# Patient Record
Sex: FEMALE | Race: BLACK OR AFRICAN AMERICAN | NOT HISPANIC OR LATINO | URBAN - METROPOLITAN AREA
[De-identification: names, ages, dates, MRNs, and addresses within clinical notes are randomized per-mention and may not be internally consistent; named-entity substitution may affect disease eponyms.]

---

## 2017-01-03 ENCOUNTER — IMPORTED ENCOUNTER (OUTPATIENT)
Dept: URBAN - METROPOLITAN AREA CLINIC 31 | Facility: CLINIC | Age: 72
End: 2017-01-03

## 2017-01-03 PROBLEM — Z96.1: Noted: 2017-01-03

## 2017-01-03 PROBLEM — H40.11X2: Noted: 2017-01-03

## 2017-01-03 PROCEDURE — 99214 OFFICE O/P EST MOD 30 MIN: CPT

## 2017-01-03 NOTE — PATIENT DISCUSSION
1.  Primary Open Angle Glaucoma OU - Continue with current treatment plan. Discussed importance of compliance. Will continue to monitor. 2.  Pseudophakia OU - IOLs stable. Monitor. 3. Return for an appointment in 3 months for pressure check. VF 24-2. with Dr. Ela Santiago.

## 2017-07-11 ENCOUNTER — IMPORTED ENCOUNTER (OUTPATIENT)
Dept: URBAN - METROPOLITAN AREA CLINIC 31 | Facility: CLINIC | Age: 72
End: 2017-07-11

## 2017-07-11 PROBLEM — H40.11X2: Noted: 2017-07-11

## 2017-07-11 PROBLEM — Z96.1: Noted: 2017-07-11

## 2017-07-11 PROCEDURE — 99213 OFFICE O/P EST LOW 20 MIN: CPT

## 2017-07-11 NOTE — PATIENT DISCUSSION
1.  Primary Open Angle Glaucoma OU - Continue with current treatment plan. Discussed importance of compliance. Will continue to monitor. 2.  Pseudophakia OU - IOLs stable. Monitor. Return for an appointment in 3 months for dilated fundus exam. OCT Nerve. with Dr. Jude Cuellar.

## 2017-10-13 ENCOUNTER — IMPORTED ENCOUNTER (OUTPATIENT)
Dept: URBAN - METROPOLITAN AREA CLINIC 31 | Facility: CLINIC | Age: 72
End: 2017-10-13

## 2017-10-13 PROBLEM — H40.11X2: Noted: 2017-10-13

## 2017-10-13 PROBLEM — Z96.1: Noted: 2017-10-13

## 2017-10-13 PROCEDURE — 99214 OFFICE O/P EST MOD 30 MIN: CPT

## 2017-10-13 PROCEDURE — 92133 CPTRZD OPH DX IMG PST SGM ON: CPT

## 2017-10-13 NOTE — PATIENT DISCUSSION
1.  Primary Open Angle Glaucoma OU - Continue with current treatment plan. Discussed importance of compliance. Will continue to monitor. 2.  Pseudophakia OU - IOLs stable. Monitor. Return for an appointment in 4 months for pressure check. Fundus Photo Disc. optos with Dr. Dena Mathis.

## 2018-02-22 ENCOUNTER — IMPORTED ENCOUNTER (OUTPATIENT)
Dept: URBAN - METROPOLITAN AREA CLINIC 31 | Facility: CLINIC | Age: 73
End: 2018-02-22

## 2018-02-22 PROBLEM — H40.1133: Noted: 2018-02-22

## 2018-02-22 PROBLEM — Z96.1: Noted: 2018-02-22

## 2018-02-22 PROBLEM — E11.9: Noted: 2018-02-22

## 2018-02-22 PROCEDURE — 99214 OFFICE O/P EST MOD 30 MIN: CPT

## 2018-02-22 PROCEDURE — 92250 FUNDUS PHOTOGRAPHY W/I&R: CPT

## 2018-02-22 NOTE — PATIENT DISCUSSION
1.  Primary Open Angle Glaucoma OU Severe- Continue with current treatment plan. Discussed importance of compliance. Will continue to monitor. 2.  DM II without sign of Diabetic Retinopathy OU:  Discussed the pathophysiology of diabetes and its effect on the eye. Stressed the importance of regular followup and good control of BS BP and Lipids to avoid future complications. 3. Pseudophakia OU - IOLs stable. Monitor. 4. Return for an appointment in 4 months for comprehensive exam. VF 24-2. with Dr. Mp Nieves.

## 2018-12-03 ENCOUNTER — IMPORTED ENCOUNTER (OUTPATIENT)
Dept: URBAN - METROPOLITAN AREA CLINIC 31 | Facility: CLINIC | Age: 73
End: 2018-12-03

## 2018-12-03 PROBLEM — H40.1133: Noted: 2018-12-03

## 2018-12-03 PROBLEM — E11.9: Noted: 2018-12-03

## 2018-12-03 PROBLEM — Z96.1: Noted: 2018-12-03

## 2018-12-03 PROCEDURE — 92014 COMPRE OPH EXAM EST PT 1/>: CPT

## 2018-12-03 PROCEDURE — 92083 EXTENDED VISUAL FIELD XM: CPT

## 2018-12-03 NOTE — PATIENT DISCUSSION
1. DM II without sign of Diabetic Retinopathy OU: Monitor. 2. Pseudophakia OU - IOLs stable. Monitor. 3. Primary Open Angle Glaucoma OU Severe- Stable VF but elevated IOPs. Recheck in 4-6 weeks. Continue with current treatment plan until next visit. Discussed importance of compliance. Will continue to monitor. 4.  Return for an appointment in 6 weeks for pressure check. with Dr. Lydia Villegas

## 2018-12-03 NOTE — PATIENT DISCUSSION
Primary Open Angle Glaucoma OU Severe- Stable VF but elevated IOPs. Recheck in 4-6 weeks. Continue with current treatment plan until next visit. Discussed importance of compliance. Will continue to monitor.

## 2019-02-11 ENCOUNTER — IMPORTED ENCOUNTER (OUTPATIENT)
Dept: URBAN - METROPOLITAN AREA CLINIC 31 | Facility: CLINIC | Age: 74
End: 2019-02-11

## 2019-02-11 PROBLEM — E11.9: Noted: 2019-02-11

## 2019-02-11 PROBLEM — H40.1133: Noted: 2019-02-11

## 2019-02-11 PROBLEM — Z96.1: Noted: 2019-02-11

## 2019-02-11 PROCEDURE — 99213 OFFICE O/P EST LOW 20 MIN: CPT

## 2019-02-11 NOTE — PATIENT DISCUSSION
1.  Primary Open Angle Glaucoma OU Severe- IOPs staying elevated. Continue with current treatment plan until sees Dr. Treva Friedman. May need repeat SLT or filtering surgery. Discussed importance of compliance. Will continue to monitor. 2.  Pseudophakia OU - IOLs stable. Monitor. 3.  DM II without sign of Diabetic Retinopathy OU:  Discussed the pathophysiology of diabetes and its effect on the eye. Stressed the importance of regular followup and good control of BS BP and Lipids to avoid future complications. 4. Return for an appointment in 2 weeks for consult with Dr. Scott Bustamante for possible repeat SLT or filter surgery.

## 2019-03-04 ENCOUNTER — IMPORTED ENCOUNTER (OUTPATIENT)
Dept: URBAN - METROPOLITAN AREA CLINIC 31 | Facility: CLINIC | Age: 74
End: 2019-03-04

## 2019-03-04 PROBLEM — Z96.1: Noted: 2019-03-04

## 2019-03-04 PROBLEM — H40.1132: Noted: 2019-03-04

## 2019-03-04 PROCEDURE — 99214 OFFICE O/P EST MOD 30 MIN: CPT

## 2019-04-17 ENCOUNTER — IMPORTED ENCOUNTER (OUTPATIENT)
Dept: URBAN - METROPOLITAN AREA CLINIC 31 | Facility: CLINIC | Age: 74
End: 2019-04-17

## 2019-04-17 PROBLEM — Z96.1: Noted: 2019-04-17

## 2019-04-17 PROBLEM — H40.1133: Noted: 2019-04-17

## 2019-04-17 PROCEDURE — 99213 OFFICE O/P EST LOW 20 MIN: CPT

## 2019-04-17 NOTE — PATIENT DISCUSSION
Primary Open Angle Glaucoma OU Severe- Continue with current treatment plan. Discussed importance of compliance. Will continue to monitor.

## 2019-04-17 NOTE — PATIENT DISCUSSION
1. s/p Selective Laser Trabeculoplasty (SLT) in the Left Eye (OS) - Monitor IOP for laser effect. Continue ALL glaucoma medications as prescribed.

## 2019-04-17 NOTE — PATIENT DISCUSSION
1. s/p Selective Laser Trabeculoplasty (SLT) in the Right Eye (OD) - Monitor IOP for effectiveness. Continue ALL glaucoma medications as prescribed.

## 2019-07-10 ENCOUNTER — IMPORTED ENCOUNTER (OUTPATIENT)
Dept: URBAN - METROPOLITAN AREA CLINIC 31 | Facility: CLINIC | Age: 74
End: 2019-07-10

## 2019-07-10 PROBLEM — H40.1132: Noted: 2019-07-10

## 2019-07-10 PROBLEM — Z96.1: Noted: 2019-07-10

## 2019-07-10 PROCEDURE — 99214 OFFICE O/P EST MOD 30 MIN: CPT

## 2019-07-10 NOTE — PATIENT DISCUSSION
1.  Primary Open Angle Glaucoma OU moderate - Continue with current treatment plan. Discussed importance of compliance. Will continue to monitor. 2.  Pseudophakia OU - IOLs stable. Monitor. 3. Return for an appointment in 3 months for dilated fundus exam. OCT Nerve. with Dr. Brianda Combs.

## 2019-10-09 ENCOUNTER — IMPORTED ENCOUNTER (OUTPATIENT)
Dept: URBAN - METROPOLITAN AREA CLINIC 31 | Facility: CLINIC | Age: 74
End: 2019-10-09

## 2019-10-09 PROBLEM — H40.1132: Noted: 2019-10-09

## 2019-10-09 PROBLEM — Z96.1: Noted: 2019-10-09

## 2019-10-09 PROCEDURE — 92133 CPTRZD OPH DX IMG PST SGM ON: CPT

## 2019-10-09 PROCEDURE — 99214 OFFICE O/P EST MOD 30 MIN: CPT

## 2019-10-09 NOTE — PATIENT DISCUSSION
1.  Primary Open Angle Glaucoma OU moderate - Better after SLT. Continue with current treatment plan. Discussed importance of compliance. Will continue to monitor. 2.  Pseudophakia OU - IOLs stable. Monitor. Return for an appointment in 4 months for pressure check. with Dr. Scott Bustamante.

## 2020-02-12 ENCOUNTER — IMPORTED ENCOUNTER (OUTPATIENT)
Dept: URBAN - METROPOLITAN AREA CLINIC 31 | Facility: CLINIC | Age: 75
End: 2020-02-12

## 2020-02-12 PROBLEM — Z96.1: Noted: 2020-02-12

## 2020-02-12 PROBLEM — H40.1132: Noted: 2020-02-12

## 2020-02-12 PROCEDURE — 99213 OFFICE O/P EST LOW 20 MIN: CPT

## 2020-02-12 NOTE — PATIENT DISCUSSION
1.  Primary Open Angle Glaucoma OU moderate - IOP stable Continue with current treatment plan. Discussed importance of compliance. Will continue to monitor. 2.  Pseudophakia OU - IOLs stable. Monitor. Return for an appointment in 4 months for pressure check. VF 24-2. with Dr. Oscar Boswell. Yes, Non-Core measure site...

## 2020-09-18 ENCOUNTER — IMPORTED ENCOUNTER (OUTPATIENT)
Dept: URBAN - METROPOLITAN AREA CLINIC 31 | Facility: CLINIC | Age: 75
End: 2020-09-18

## 2020-09-18 PROBLEM — H40.1132: Noted: 2020-09-18

## 2020-09-18 PROBLEM — H40.1133: Noted: 2020-09-18

## 2020-09-18 PROBLEM — Z96.1: Noted: 2020-09-18

## 2020-09-18 PROCEDURE — 92083 EXTENDED VISUAL FIELD XM: CPT

## 2020-09-18 PROCEDURE — 99213 OFFICE O/P EST LOW 20 MIN: CPT

## 2021-03-22 ENCOUNTER — IMPORTED ENCOUNTER (OUTPATIENT)
Dept: URBAN - METROPOLITAN AREA CLINIC 31 | Facility: CLINIC | Age: 76
End: 2021-03-22

## 2021-03-22 PROBLEM — H40.1133: Noted: 2021-03-22

## 2021-03-22 PROBLEM — Z96.1: Noted: 2021-03-22

## 2021-03-22 PROCEDURE — 92012 INTRM OPH EXAM EST PATIENT: CPT

## 2021-03-22 NOTE — PATIENT DISCUSSION
1.  Primary Open Angle Glaucoma OU Severe-  IOP above target OD Increase dorz/timolol and brimonidine to 3x/d OD 2x/d OS latanoprost ou qhs. Discussed importance of compliance. Will continue to monitor for stability or progression. 2.  Pseudophakia OU - IOLs stable. Monitor for changes in vision. Return for an appointment in 2 months for dilated fundus exam. Fundus Photo Disc. optos with Dr. Tamara Espinoza.

## 2021-05-24 ENCOUNTER — IMPORTED ENCOUNTER (OUTPATIENT)
Dept: URBAN - METROPOLITAN AREA CLINIC 31 | Facility: CLINIC | Age: 76
End: 2021-05-24

## 2021-05-24 PROBLEM — Z96.1: Noted: 2021-05-24

## 2021-05-24 PROBLEM — H40.1133: Noted: 2021-05-24

## 2021-05-24 PROCEDURE — 92250 FUNDUS PHOTOGRAPHY W/I&R: CPT

## 2021-05-24 PROCEDURE — 99214 OFFICE O/P EST MOD 30 MIN: CPT

## 2021-05-24 NOTE — PATIENT DISCUSSION
1.  Primary Open Angle Glaucoma OU Severe-  Advanced cupping on disc photos. IOP above target change latanoprost to vyzulta ou qhs. Brimondine dorz/timolol the same. Discussed importance of compliance. Will continue to monitor for stability or progression. 2.  Pseudophakia OU - IOLs stable. Monitor for changes in vision. 3. Return for an appointment in 1 month for pressure check. with Dr. Eder Winter.

## 2021-08-09 ENCOUNTER — IMPORTED ENCOUNTER (OUTPATIENT)
Dept: URBAN - METROPOLITAN AREA CLINIC 31 | Facility: CLINIC | Age: 76
End: 2021-08-09

## 2021-08-09 PROBLEM — H40.1133: Noted: 2021-08-09

## 2021-08-09 PROBLEM — Z96.1: Noted: 2021-08-09

## 2021-08-09 PROCEDURE — 99213 OFFICE O/P EST LOW 20 MIN: CPT

## 2021-08-09 NOTE — PATIENT DISCUSSION
1.  Primary Open Angle Glaucoma OU Severe-  Advanced cupping on disc photos. IOP better on vyzulta ou qhs. Continue Brimondine dorz/timolol the same. Discussed importance of compliance. Will continue to monitor for stability or progression. 2.  Pseudophakia OU - IOLs stable. Monitor for changes in vision. Return for an appointment in 4 months for pressure check. VF 24-2. with Dr. Kristi Villegas

## 2022-01-14 ENCOUNTER — IMPORTED ENCOUNTER (OUTPATIENT)
Dept: URBAN - METROPOLITAN AREA CLINIC 31 | Facility: CLINIC | Age: 77
End: 2022-01-14

## 2022-01-14 PROBLEM — Z96.1: Noted: 2022-01-14

## 2022-01-14 PROBLEM — H40.1133: Noted: 2022-01-14

## 2022-01-14 PROCEDURE — 92083 EXTENDED VISUAL FIELD XM: CPT

## 2022-01-14 PROCEDURE — 99213 OFFICE O/P EST LOW 20 MIN: CPT

## 2022-01-14 NOTE — PATIENT DISCUSSION
1.  Primary Open Angle Glaucoma OU Severe-  IOP higher  VF worse od. Brimonidine 3x/d ou dorz/timolol 3x/d ou and vyzulta qhs Discussed importance of compliance. Will continue to monitor for stability or progression. 2.  Pseudophakia OU - IOLs stable. Monitor for changes in vision. Return for an appointment in 3 months for pressure check. with Dr. Moy Myles.

## 2022-04-02 ASSESSMENT — VISUAL ACUITY
OS_SC: 20/30+2
OD_CC: 20/25-2
OD_SC: 20/25
OS_CC: 20/25-1
OS_CC: 20/50
OS_CC: 20/25-3
OD_CC: 20/25-2
OS_CC: 20/25
OD_CC: 20/40+2
OD_PH: SC 20/20 -2
OS_CC: 20/25-2
OS_CC: 20/25
OD_SC: 20/20-1
OD_CC: 20/30-1
OD_CC: 20/40+1
OS_CC: 20/30+2
OS_PH: SC 20/25 -2
OS_SC: 20/25
OS_CC: 20/30-2
OD_PH: SC 20/30
OU_SC: 20/40
OD_CC: 20/25-3
OD_PH: SC 20/25 -2
OD_CC: 20/50-1
OD_SC: 20/25
OS_CC: 20/25
OU_SC: 20/40
OS_CC: 20/25
OS_SC: 20/25-1
OD_CC: 20/50-2
OD_CC: 20/50-1
OD_CC: 20/30+1
OS_CC: 20/30-2
OD_CC: 20/30-2
OD_PH: SC 20/20 -2
OS_PH: SC 20/25 +2
OD_SC: 20/20
OD_CC: 20/30-1
OD_PH: SC 20/30 +2
OD_CC: 20/30-2
OS_SC: 20/25
OU_CC: 20/20-3
OS_CC: 20/25-2
OS_CC: 20/25+1

## 2022-04-02 ASSESSMENT — TONOMETRY
OD_IOP_MMHG: 13
OD_IOP_MMHG: 21
OD_IOP_MMHG: 18
OD_IOP_MMHG: 20
OD_IOP_MMHG: 17
OS_IOP_MMHG: 19
OS_IOP_MMHG: 18
OS_IOP_MMHG: 19
OS_IOP_MMHG: 17
OD_IOP_MMHG: 16
OD_IOP_MMHG: 20
OD_IOP_MMHG: 14
OS_IOP_MMHG: 17
OS_IOP_MMHG: 14
OD_IOP_MMHG: 17
OS_IOP_MMHG: 17
OS_IOP_MMHG: 13
OS_IOP_MMHG: 17
OD_IOP_MMHG: 16
OS_IOP_MMHG: 17
OS_IOP_MMHG: 13
OS_IOP_MMHG: 15
OD_IOP_MMHG: 20
OD_IOP_MMHG: 16
OS_IOP_MMHG: 16
OS_IOP_MMHG: 13
OD_IOP_MMHG: 21
OS_IOP_MMHG: 16
OD_IOP_MMHG: 13
OD_IOP_MMHG: 16
OS_IOP_MMHG: 17
OD_IOP_MMHG: 15

## 2022-04-11 ENCOUNTER — PREPPED CHART (OUTPATIENT)
Dept: URBAN - METROPOLITAN AREA CLINIC 29 | Facility: CLINIC | Age: 77
End: 2022-04-11

## 2022-04-11 ENCOUNTER — FOLLOW UP (OUTPATIENT)
Dept: URBAN - METROPOLITAN AREA CLINIC 29 | Facility: CLINIC | Age: 77
End: 2022-04-11

## 2022-04-11 DIAGNOSIS — H40.1133: ICD-10-CM

## 2022-04-11 DIAGNOSIS — Z96.1: ICD-10-CM

## 2022-04-11 PROCEDURE — 99213 OFFICE O/P EST LOW 20 MIN: CPT

## 2022-04-11 ASSESSMENT — VISUAL ACUITY
OD_SC: 20/30 -2
OS_SC: 20/30

## 2022-04-11 ASSESSMENT — TONOMETRY
OS_IOP_MMHG: 17
OD_IOP_MMHG: 16

## 2022-04-11 NOTE — PATIENT DISCUSSION
1.  Primary Open Angle Glaucoma OU Severe-  IOP higher  VF worse od. Brimonidine 3x/d ou dorz/timolol 3x/d ou and vyzulta qhs Discussed importance of compliance. Will continue to monitor for stability or progression. 2.  Pseudophakia OU - IOLs stable. Monitor for changes in vision. Return for an appointment in 3 months for pressure check. with Dr. Luz Marina Hodges.

## 2022-07-11 ENCOUNTER — FOLLOW UP (OUTPATIENT)
Dept: URBAN - METROPOLITAN AREA CLINIC 29 | Facility: CLINIC | Age: 77
End: 2022-07-11

## 2022-07-11 DIAGNOSIS — H40.1133: ICD-10-CM

## 2022-07-11 DIAGNOSIS — Z96.1: ICD-10-CM

## 2022-07-11 PROCEDURE — 92133 CPTRZD OPH DX IMG PST SGM ON: CPT

## 2022-07-11 PROCEDURE — 92014 COMPRE OPH EXAM EST PT 1/>: CPT

## 2022-07-11 ASSESSMENT — TONOMETRY
OS_IOP_MMHG: 16
OD_IOP_MMHG: 16

## 2022-07-11 ASSESSMENT — VISUAL ACUITY
OS_SC: 20/30
OD_SC: 20/30-2

## 2023-01-09 ENCOUNTER — FOLLOW UP (OUTPATIENT)
Dept: URBAN - METROPOLITAN AREA CLINIC 29 | Facility: CLINIC | Age: 78
End: 2023-01-09

## 2023-01-09 DIAGNOSIS — Z96.1: ICD-10-CM

## 2023-01-09 DIAGNOSIS — H40.1133: ICD-10-CM

## 2023-01-09 PROCEDURE — 92083 EXTENDED VISUAL FIELD XM: CPT

## 2023-01-09 PROCEDURE — 92012 INTRM OPH EXAM EST PATIENT: CPT

## 2023-01-09 ASSESSMENT — VISUAL ACUITY
OS_SC: 20/60
OD_SC: 20/30
OD_SC: 20/30
OS_SC: 20/30+2

## 2023-01-09 ASSESSMENT — TONOMETRY
OD_IOP_MMHG: 24
OD_IOP_MMHG: 18
OS_IOP_MMHG: 16